# Patient Record
Sex: FEMALE | Race: WHITE | NOT HISPANIC OR LATINO | Employment: FULL TIME | URBAN - METROPOLITAN AREA
[De-identification: names, ages, dates, MRNs, and addresses within clinical notes are randomized per-mention and may not be internally consistent; named-entity substitution may affect disease eponyms.]

---

## 2023-05-19 ENCOUNTER — OFFICE VISIT (OUTPATIENT)
Dept: OBSTETRICS AND GYNECOLOGY | Facility: CLINIC | Age: 28
End: 2023-05-19
Payer: COMMERCIAL

## 2023-05-19 VITALS
SYSTOLIC BLOOD PRESSURE: 121 MMHG | WEIGHT: 176.6 LBS | HEIGHT: 67 IN | DIASTOLIC BLOOD PRESSURE: 70 MMHG | BODY MASS INDEX: 27.72 KG/M2 | HEART RATE: 71 BPM

## 2023-05-19 DIAGNOSIS — Z01.411 ENCOUNTER FOR GYNECOLOGICAL EXAMINATION WITH ABNORMAL FINDING: Primary | ICD-10-CM

## 2023-05-19 DIAGNOSIS — Z12.4 SCREENING FOR MALIGNANT NEOPLASM OF CERVIX: ICD-10-CM

## 2023-05-19 DIAGNOSIS — Z11.3 SCREEN FOR SEXUALLY TRANSMITTED DISEASES: ICD-10-CM

## 2023-05-19 NOTE — PROGRESS NOTES
Chief Complaint  New Gyn (Patient is here for an annual exam. )    Subjective        Lalita Collazo presents to [facility]  History of Present Illness  Patient is here for annual exam.  She is without gynecologic complaints at this time.  She has a Mirena IUD which was placed about 2 years ago.  She reports amenorrhea with the IUD.  Patient says that she is considering serving as an egg donor.  She asks if she would have to have the IUD removed prior to this process.  She is otherwise without complaints at this time.    Menstrual History:  OB History        2    Para   2    Term   2            AB        Living   2       SAB        IAB        Ectopic        Molar        Multiple        Live Births   2               No LMP recorded. Patient has had an implant.     History reviewed. No pertinent past medical history.    History reviewed. No pertinent surgical history.    Social History     Tobacco Use   • Smoking status: Never   Vaping Use   • Vaping Use: Never used   Substance Use Topics   • Alcohol use: No   • Drug use: No       Family History   Problem Relation Age of Onset   • Colon cancer Maternal Grandfather    • Breast cancer Neg Hx    • Ovarian cancer Neg Hx    • Uterine cancer Neg Hx        Current Outpatient Medications on File Prior to Visit   Medication Sig   • FIBER PO Take  by mouth.   • Levonorgestrel (MIRENA) 20 MCG/DAY intrauterine device IUD 1 each by Intrauterine route.   • [DISCONTINUED] ibuprofen (ADVIL,MOTRIN) 800 MG tablet Take 1 tablet by mouth Every 8 (Eight) Hours As Needed (chest pain).     No current facility-administered medications on file prior to visit.       Allergies   Allergen Reactions   • Penicillins          Review of systems:  Constitutional: No fevers, chills, sweats   Eye: No recent visual problems, denies blurry vision   HEENT: No ear pain, nasal congestion, sore throat, voice changes   Respiratory: No shortness of breath,  "cough, pain on breathing   Cardiovascular: No Chest pain, palpitations   Gastrointestinal: No nausea, vomiting, diarrhea, constipation   Genitourinary: No hematuria, dysuria, lesions on genitalia   Hema/Lymph: Negative for bruising, no edema   Endocrine: Negative for excessive thirst, excessive hunger, heat or cold intolerance   Musculoskeletal: No joint pain, muscle pain, decreased range of motion   Integumentary: No rash, pruritus, abrasions, lesions   Neurologic: No weakness, numbness, headaches   Psychiatric: No anxiety, depression, mood changes         Objective   Vital Signs:  /70   Pulse 71   Ht 170.2 cm (67\")   Wt 80.1 kg (176 lb 9.6 oz)   BMI 27.66 kg/m²   Estimated body mass index is 27.66 kg/m² as calculated from the following:    Height as of this encounter: 170.2 cm (67\").    Weight as of this encounter: 80.1 kg (176 lb 9.6 oz).             Physical Exam   Exam performed in the presence of a female chaperone  Patient has provided verbal consent to proceed with exam.    Gen: No acute distress, awake and oriented times three  HENT: Normocephalic, atraumatic, Moist mucous membranes  Eyes: PERRLA, EOMI  Lungs: Normal work of breathing, lungs clear bilaterally  Breast: Symmetrical. No skin changes or nipple retractions. No lumps or masses bilaterally. No tenderness bilaterally.  Abdomen: soft, nontender, no masses or hernia, no hepatosplenomegaly, non distended, normoactive bowel sounds  Normal external female genitalia, no lesions  Urethra: Normal meatus, no caruncle  Bladder: nontender  Vagina: No blood or discharge  Cervix: No cervical motion tenderness, no lesions, no active bleeding, nonfriable, IUD string seen at os and appear normal length  Uterus: Anteverted, normal size and shape, nontender  Adnexa: No masses or tenderness  External anal exam: Normal appearance, no lesions or hemorrhoids  Rectal: Deferred  Skin: Warm and dry, no rashes  Psych: Good judgement and insight, normal affect and " mood  Neuro: CN 2-12 intact, no gross deficits      Result Review :                   Assessment and Plan   Diagnoses and all orders for this visit:    1. Encounter for gynecological examination with abnormal finding (Primary)    Pap - Ordered today.  STD screening - Cultures performed today. Labs offered to pt and patient declines.  Contraception - Options discussed with pt at length. Risks, benefits, side effects, and alternatives to various forms of hormonal, nonhormonal and barrier methods discussed. Pt elects continue Mirena.  Patient asked if she would need to have her Mirena IUD removed prior to egg retrieval.  I explained that I do not know the answer to this.  I recommend that she discuss this with the [facility] practice that she sees for this process.  If she desires or needs to have her IUD removed, we are able to do this for her at her request.  Safe sex practices encouraged with patient.  Breast cancer screening. Patient encouraged to perform routine self breast exams. Recommend yearly clinical breast exam and mammogram starting at age 40.  Recommend pt take calcium and vitamin D supplementation as well as prenatal vitamin or folic acid if she is attempting to conceive.  Encourage aerobic exercise with at least 30 minutes 5 days/wk.  Avoid excessive alcohol use.  Patient is advised to call the office for results after 1 week if she has not seen or heard the results of any tests ordered or performed today.    2. Screening for malignant neoplasm of cervix  -     IGP,CtNgTv,rfx Apt HPV All    3. Screen for sexually transmitted diseases  -     IGP,CtNgTv,rfx Apt HPV All                 Follow Up   Return in about 1 year (around 5/19/2024) for Annual physical.  Patient was given instructions and counseling regarding her condition or for health maintenance advice. Please see specific information pulled into the AVS if appropriate.

## 2023-05-25 LAB
C TRACH RRNA CVX QL NAA+PROBE: NEGATIVE
CONV .: NORMAL
CYTOLOGIST CVX/VAG CYTO: NORMAL
CYTOLOGY CVX/VAG DOC CYTO: NORMAL
CYTOLOGY CVX/VAG DOC THIN PREP: NORMAL
DX ICD CODE: NORMAL
HIV 1 & 2 AB SER-IMP: NORMAL
N GONORRHOEA RRNA CVX QL NAA+PROBE: NEGATIVE
OTHER STN SPEC: NORMAL
STAT OF ADQ CVX/VAG CYTO-IMP: NORMAL
T VAGINALIS RRNA SPEC QL NAA+PROBE: NEGATIVE

## 2023-06-14 ENCOUNTER — OFFICE VISIT (OUTPATIENT)
Dept: OBSTETRICS AND GYNECOLOGY | Facility: CLINIC | Age: 28
End: 2023-06-14
Payer: COMMERCIAL

## 2023-06-14 VITALS
DIASTOLIC BLOOD PRESSURE: 68 MMHG | HEIGHT: 67 IN | SYSTOLIC BLOOD PRESSURE: 120 MMHG | BODY MASS INDEX: 28.47 KG/M2 | WEIGHT: 181.4 LBS

## 2023-06-14 DIAGNOSIS — Z30.011 ENCOUNTER FOR INITIAL PRESCRIPTION OF CONTRACEPTIVE PILLS: ICD-10-CM

## 2023-06-14 DIAGNOSIS — Z30.432 ENCOUNTER FOR IUD REMOVAL: Primary | ICD-10-CM

## 2023-06-14 RX ORDER — NORETHINDRONE ACETATE AND ETHINYL ESTRADIOL AND FERROUS FUMARATE 1MG-20(24)
1 KIT ORAL DAILY
Qty: 28 TABLET | Refills: 12 | Status: SHIPPED | OUTPATIENT
Start: 2023-06-14 | End: 2024-06-13

## 2023-06-14 NOTE — PROGRESS NOTES
Procedure: Mirena Intrauterine device removal  Preoperative diagnosis: Encounter for IUD removal  Postoperative diagnosis: Same  Indications: Patient presents for IUD removal.  She has plans to try to service an egg donor, and the fertility specialist have recommended that she have her IUD removed.  She would like to start on birth control pills at that of this.  Anesthesia: None  Pathology: None  Estimated blood loss: Less than 5 mL  Complications: None    Procedure in detail:  Patient placed in lithotomy position in stirrups. Spring speculum placed into vagina. Cervix was visualized. IUD strings were seen at the external os. Strings were grasped with a ring forceps and the IUD was easily removed with slow, steady, gentle tension. IUD was removed intact and discarded. No complications. Patient tolerated the procedure well. She was instructed to use condoms as a backup method for the first month if not attempting to conceive.

## 2023-06-14 NOTE — PROGRESS NOTES
"Chief Complaint  Gynecologic Exam (Patient is here today to mirena removed. Would like to discuss starting the pill)    Subjective        Lalita Collazo presents to [facility]  History of Present Illness  Patient is here to have her IUD removed and to discuss starting on the birth control pill.  Patient has plans to try to serve as an egg donor, and the infertility specialists are recommending that she have her Mirena removed at this time.  As she is not actively trying to conceive, she would like to start on birth control pill.  The infertility office has also recommended this as well.  She is otherwise without complaints at this time.  Typically has amenorrhea with her IUD.  She has taken birth control pills in the past.  She says in general it worked well for her.  She does not remember which specific type of birth control pill she was taking.\\    The following portions of the patient's history were reviewed and updated as appropriate: allergies, current medications, past family history, past medical history, past social history, past surgical history, and problem list.]    Objective   Vital Signs:  /68   Ht 170.2 cm (67\")   Wt 82.3 kg (181 lb 6.4 oz)   BMI 28.41 kg/m²   Estimated body mass index is 28.41 kg/m² as calculated from the following:    Height as of this encounter: 170.2 cm (67\").    Weight as of this encounter: 82.3 kg (181 lb 6.4 oz).             Physical Exam   Gen: No acute distress, awake and oriented times three  Abdomen: soft, nontender, no masses or hernia, no hepatosplenomegaly, non distended, normoactive bowel sounds  Pelvic: Exam performed in the presence of a female chaperone  Patient has provided verbal consent to proceed with exam.  Normal external female genitalia, no lesions  Urethra: Normal meatus, no caruncle  Bladder: nontender  Vagina: No blood or discharge  Cervix: No cervical motion tenderness, no lesions, no active bleeding, nonfriable, IUD string " seen at the os, normal line  External anal exam: Normal appearance, no lesions or hemorrhoids  Rectal: Deferred  Psych: Good judgement and insight, normal affect and mood\  Result Review :                   Assessment and Plan   Diagnoses and all orders for this visit:    1. Encounter for IUD removal (Primary)  IUD removed without difficulty.  See procedure note for further details.    2. Encounter for initial prescription of contraceptive pills  -     norethindrone-ethinyl estradiol-ferrous fumarate (LOESTIN 24 FE) 1-20 MG-MCG(24) per tablet; Take 1 tablet by mouth Daily.  Dispense: 28 tablet; Refill: 12    Various contraceptive options discussed including natural family planning and abstinence. Paitent elects for oral contraceptive pills. Risks, benefits, alternatives discussed at length. Risks of venous thromboembolic complications discussed. Instructions for use and Sunday start discussed. Discussed condoms in first month for backup method. Also discussed condoms for STD prevention. All questions answered.            Follow Up   Return in about 1 year (around 6/14/2024), or if symptoms worsen or fail to improve, for Annual physical.  Patient was given instructions and counseling regarding her condition or for health maintenance advice. Please see specific information pulled into the AVS if appropriate.

## 2023-11-29 ENCOUNTER — OFFICE VISIT (OUTPATIENT)
Dept: OBSTETRICS AND GYNECOLOGY | Facility: CLINIC | Age: 28
End: 2023-11-29
Payer: COMMERCIAL

## 2023-11-29 VITALS
HEIGHT: 67 IN | DIASTOLIC BLOOD PRESSURE: 78 MMHG | SYSTOLIC BLOOD PRESSURE: 110 MMHG | WEIGHT: 176.2 LBS | BODY MASS INDEX: 27.65 KG/M2

## 2023-11-29 DIAGNOSIS — Z00.00 ENCOUNTER FOR PHYSICAL EXAMINATION: Primary | ICD-10-CM

## 2023-11-29 NOTE — PROGRESS NOTES
"Chief Complaint  Gynecologic Exam (Patient is here to have form filled out for egg donation )    Subjective        Lalita Collazo presents to [facility]  History of Present Illness  Patient is here for an encounter to fill out a medical physical form to be a candidate for an egg donor.  She had her IUD removed about 5 months ago.  She reports regular menstrual cycle since then.  She is otherwise without gynecologic complaints.  Patient reports no previous issues with adverse effects from anesthesia.    The following portions of the patient's history were reviewed and updated as appropriate: allergies, current medications, past family history, past medical history, past social history, past surgical history, and problem list.    Objective   Vital Signs:  /78   Ht 170.2 cm (67\")   Wt 79.9 kg (176 lb 3.2 oz)   BMI 27.60 kg/m²   Estimated body mass index is 27.6 kg/m² as calculated from the following:    Height as of this encounter: 170.2 cm (67\").    Weight as of this encounter: 79.9 kg (176 lb 3.2 oz).             Physical Exam       Gen: No acute distress, awake and oriented times three  HENT: Normocephalic, atraumatic, Moist mucous membranes  Eyes: PERRLA, EOMI  Neck: Soft, no masses, no thyromegaly, normal range of motion  Cardiovascular: Regular rate and rhythm, no murmurs  Lungs: Normal work of breathing, lungs clear bilaterally  Abdomen: soft, nontender, no masses or hernia, non distended, normoactive bowel sounds  Breast and pelvic exam previously done 5/2023 with normal findings.  Skin: Warm and dry, no rashes  Psych: Good judgement and insight, normal affect and mood  Neuro: CN 2-12 intact, no gross deficits    Result Review :  The following data was reviewed by: Eduin Asher MD on 11/29/2023:        No visits with results within 180 Day(s) from this visit.   Latest known visit with results is:   Office Visit on 05/19/2023   Component Date Value Ref Range Status    " Diagnosis 05/19/2023 Comment   Final    Comment: NEGATIVE FOR INTRAEPITHELIAL LESION OR MALIGNANCY.  PREDOMINANCE OF COCCOBACILLI CONSISTENT WITH SHIFT IN VAGINAL CHANNING IS  PRESENT.      Specimen adequacy: 05/19/2023 Comment   Final    Comment: Satisfactory for evaluation.  Endocervical and/or squamous metaplastic  cells (endocervical component) are present.      Clinician Provided ICD-10: 05/19/2023 Comment   Final    Comment: Z12.4  Z11.3      Performed by: 05/19/2023 Comment   Final    Lala Rivera, Cytotechnologist    . 05/19/2023 .   Final    Note: 05/19/2023 Comment   Final    Comment: The Pap smear is a screening test designed to aid in the detection of  premalignant and malignant conditions of the uterine cervix.  It is not a  diagnostic procedure and should not be used as the sole means of detecting  cervical cancer.  Both false-positive and false-negative reports do occur.      Method: 05/19/2023 Comment   Final    Comment: This liquid based ThinPrep(R) pap test was screened with the  use of an image guided system.      Conv .conv 05/19/2023 Comment   Final    Comment: The HPV DNA reflex criteria were not met with this specimen result  therefore, no HPV testing was performed.      Chlamydia, Nuc. Acid Amp 05/19/2023 Negative  Negative Final    Gonococcus by Nucleic Acid Amp 05/19/2023 Negative  Negative Final    Trichomonas vaginosis 05/19/2023 Negative  Negative Final                Assessment and Plan   Diagnoses and all orders for this visit:    1. Encounter for physical examination (Primary)    No contraindications to the patient serving as a egg donor.  Form filled out on behalf of the patient and returned to her.  She may follow-up here as needed or once a year for annual exam.         Follow Up   No follow-ups on file.  Patient was given instructions and counseling regarding her condition or for health maintenance advice. Please see specific information pulled into the AVS if appropriate.

## 2024-01-19 DIAGNOSIS — Z30.011 ENCOUNTER FOR INITIAL PRESCRIPTION OF CONTRACEPTIVE PILLS: ICD-10-CM

## 2024-01-19 RX ORDER — NORETHINDRONE ACETATE AND ETHINYL ESTRADIOL AND FERROUS FUMARATE 1MG-20(24)
1 KIT ORAL DAILY
Qty: 28 TABLET | Refills: 12 | Status: SHIPPED | OUTPATIENT
Start: 2024-01-19 | End: 2025-01-18

## 2024-01-23 ENCOUNTER — TELEPHONE (OUTPATIENT)
Dept: OBSTETRICS AND GYNECOLOGY | Facility: CLINIC | Age: 29
End: 2024-01-23
Payer: COMMERCIAL

## 2024-01-23 NOTE — TELEPHONE ENCOUNTER
Please let her know that I think it is unlikely we will be able to get this done before January 31.  We will have to submit for prior authorization for IUD.  I assume that she wants a Mirena again, but please verify that with her.    Yusra, can you go ahead and try to start the process of getting prior authorization for a Mirena for contraception.    Please advise the patient that she should not have any intercourse between now and the end of the month if we are going to try to get her Mirena in before the end of the month.

## 2024-01-23 NOTE — TELEPHONE ENCOUNTER
----- Message from Lalita Collazo sent at 1/23/2024 10:46 AM EST -----  Regarding: Appointment Request  Contact: [phone]  Appointment Request From: Lalita Collazo    With Provider: Eduin Asher [[facility]]    Preferred Date Range: Any    Preferred Times: Any Time    Reason for visit: Pre-Op    Comments:  I was disqualified as an egg donor due to AMH levels of 0.505 and Antral follicle count of 9 (both measured on day 2 of period). I’m interested in having an IUD inserted again.  My current insurance is good through January 31, and then I won’t have insurance again until early March.   Most recent period was January 17-22.

## 2024-01-23 NOTE — TELEPHONE ENCOUNTER
Spoke with patient, informed her of the system of sending off pa for iud's before getting, I told her I just faxed hers this morning theres a slight possibility we could get it and if so ill schedule her the day I get, but if not well restart the process in march when she gets her new insurance. She understood completely said she has 3 months worth of birth control pills for if does not get in time Claudette

## 2024-04-15 DIAGNOSIS — Z30.011 ENCOUNTER FOR INITIAL PRESCRIPTION OF CONTRACEPTIVE PILLS: ICD-10-CM

## 2024-04-15 RX ORDER — NORETHINDRONE ACETATE AND ETHINYL ESTRADIOL AND FERROUS FUMARATE 1MG-20(24)
1 KIT ORAL DAILY
Qty: 90 TABLET | Refills: 3 | Status: SHIPPED | OUTPATIENT
Start: 2024-04-15

## 2024-06-14 RX ORDER — LEVONORGESTREL AND ETHINYL ESTRADIOL 0.1-0.02MG
1 KIT ORAL DAILY
Qty: 28 TABLET | Refills: 12 | Status: SHIPPED | OUTPATIENT
Start: 2024-06-14

## 2024-07-10 RX ORDER — LEVONORGESTREL/ETHIN.ESTRADIOL 0.1-0.02MG
1 TABLET ORAL DAILY
Qty: 84 TABLET | Refills: 3 | Status: SHIPPED | OUTPATIENT
Start: 2024-07-10 | End: 2025-07-10

## 2024-07-16 ENCOUNTER — OFFICE VISIT (OUTPATIENT)
Dept: OBSTETRICS AND GYNECOLOGY | Facility: CLINIC | Age: 29
End: 2024-07-16
Payer: COMMERCIAL

## 2024-07-16 VITALS
HEIGHT: 67 IN | SYSTOLIC BLOOD PRESSURE: 130 MMHG | WEIGHT: 165.6 LBS | DIASTOLIC BLOOD PRESSURE: 85 MMHG | BODY MASS INDEX: 25.99 KG/M2

## 2024-07-16 DIAGNOSIS — Z01.411 ENCOUNTER FOR GYNECOLOGICAL EXAMINATION WITH ABNORMAL FINDING: Primary | ICD-10-CM

## 2024-07-16 DIAGNOSIS — Z11.3 SCREEN FOR SEXUALLY TRANSMITTED DISEASES: ICD-10-CM

## 2024-07-16 DIAGNOSIS — Z30.41 ENCOUNTER FOR SURVEILLANCE OF CONTRACEPTIVE PILLS: ICD-10-CM

## 2024-07-16 DIAGNOSIS — Z30.09 FAMILY PLANNING ADVICE: ICD-10-CM

## 2024-07-16 RX ORDER — LEVONORGESTREL AND ETHINYL ESTRADIOL 0.1-0.02MG
1 KIT ORAL DAILY
COMMUNITY
Start: 2024-07-12 | End: 2024-07-16 | Stop reason: SDUPTHER

## 2024-07-16 RX ORDER — LEVONORGESTREL/ETHIN.ESTRADIOL 0.1-0.02MG
1 TABLET ORAL DAILY
Qty: 84 TABLET | Refills: 3 | Status: SHIPPED | OUTPATIENT
Start: 2024-07-16 | End: 2025-07-16

## 2024-07-16 NOTE — PROGRESS NOTES
"Chief Complaint  Gynecologic Exam (Annual exam , last pap 2023 neg)    Subjective        Lalita Collazo presents to [facility]  History of Present Illness  Here for annual.  Last year, patient was considering doing a donation; however, when she went to the fertility clinic she was told that she had a \" low ovarian reserve\" based on ultrasound and blood work.  She is uncertain of exactly what types of blood work was done.    The following portions of the patient's history were reviewed and updated as appropriate: allergies, current medications, past family history, past medical history, past social history, past surgical history, and problem list.    Objective   Vital Signs:  /85   Ht 170.2 cm (67.01\")   Wt 75.1 kg (165 lb 9.6 oz)   BMI 25.93 kg/m²   Estimated body mass index is 25.93 kg/m² as calculated from the following:    Height as of this encounter: 170.2 cm (67.01\").    Weight as of this encounter: 75.1 kg (165 lb 9.6 oz).             Physical Exam   Exam performed in the presence of a female chaperone  Patient has provided verbal consent to proceed with exam.    Gen: No acute distress, awake and oriented times three  HENT: Normocephalic, atraumatic, Moist mucous membranes  Eyes: PERRLA, EOMI  Lungs: Normal work of breathing, lungs clear bilaterally  Breast: Symmetrical. No skin changes or nipple retractions. No lumps or masses bilaterally. No tenderness bilaterally.  Abdomen: soft, nontender, no masses or hernia, non distended, normoactive bowel sounds  Normal external female genitalia, no lesions  Urethra: Normal meatus, no caruncle  Bladder: nontender  Vagina: No blood or discharge  Cervix: No cervical motion tenderness, no lesions, no active bleeding, nonfriable  Uterus: Anteverted, normal size and shape, nontender  Adnexa: No masses or tenderness  External anal exam: Normal appearance, no lesions or hemorrhoids  Rectal: Deferred  Skin: Warm and dry, no rashes  Psych: Good " judgement and insight, normal affect and mood  Neuro: CN 2-12 intact, no gross deficits    Result Review :                     Assessment and Plan     Diagnoses and all orders for this visit:    1. Encounter for gynecological examination with abnormal finding (Primary)    2. Family planning advice    3. Screen for sexually transmitted diseases  -     HIV-1 / O / 2 Ag / Antibody  -     Hepatitis B Surface Antigen  -     HCV Antibody Rfx To Qnt PCR  -     RPR, Rfx Qn RPR / Confirm TP  -     Chlamydia trachomatis, Neisseria gonorrhoeae, Trichomonas vaginalis, PCR - Urine, Urine, Clean Catch    4. Encounter for surveillance of contraceptive pills  -     levonorgestrel-ethinyl estradiol (AVIANE,ALESSE,LESSINA) 0.1-20 MG-MCG per tablet; Take 1 tablet by mouth Daily.  Dispense: 84 tablet; Refill: 3    Other orders  -     Interpretation:    Pap -up-to-date.  Not indicated until 2026.  STD screening - Cultures performed today. Labs offered to pt and patient agrees.  Contraception - Options discussed with pt at length. Risks, benefits, side effects, and alternatives to various forms of hormonal, nonhormonal and barrier methods discussed. Pt elects continue OCPs.   Safe sex practices encouraged with patient.  Breast cancer screening. Patient encouraged to perform routine self breast exams. Recommend yearly clinical breast exam and mammogram starting at age 40.  Recommend pt take calcium and vitamin D supplementation as well as prenatal vitamin or folic acid if she is attempting to conceive.  Encourage aerobic exercise with at least 30 minutes 5 days/wk.  Avoid excessive alcohol use.  Patient is advised to call the office for results after 1 week if she has not seen or heard the results of any tests ordered or performed today.      We discussed the patient's recent diagnosis of a low ovarian reserve.  We discussed ways that this is diagnosed.  If the patient can find what blood work was done, this will be helpful to know  further counseling.  Potentially an AMH may be of use if she has not done it yet.  We discussed this low ovarian reserve in terms of the patient's own future fertility and risk of premature menopause.  Explained that if she is interested in having children in the future, she may wish to consider oocyte preservation.  Explained this is often covered under a lot of insurance plans now.  Pt says she will consider. Wants to stay on OCPs for now           Follow Up     Return in about 1 year (around 7/16/2025).  Patient was given instructions and counseling regarding her condition or for health maintenance advice. Please see specific information pulled into the AVS if appropriate.

## 2024-07-17 LAB
HBV SURFACE AG SERPL QL IA: NEGATIVE
HCV AB SERPL QL IA: NORMAL
HCV IGG SERPL QL IA: NON REACTIVE
HIV 1+2 AB+HIV1 P24 AG SERPL QL IA: NON REACTIVE
RPR SER QL: NON REACTIVE

## 2024-07-18 ENCOUNTER — TELEPHONE (OUTPATIENT)
Dept: OBSTETRICS AND GYNECOLOGY | Facility: CLINIC | Age: 29
End: 2024-07-18
Payer: COMMERCIAL

## 2024-07-18 LAB
C TRACH RRNA SPEC QL NAA+PROBE: NORMAL
N GONORRHOEA RRNA SPEC QL NAA+PROBE: NORMAL
T VAGINALIS RRNA SPEC QL NAA+PROBE: NORMAL

## 2024-07-18 NOTE — TELEPHONE ENCOUNTER
----- Message from Eduin Asher sent at 7/18/2024 10:43 AM EDT -----  Please let the patient know that her STD blood work was all negative, but they were unable to run the test for gonorrhea and chlamydia.  If she would like to have this done, she can be added on any day to come in and have another test performed.

## 2024-11-25 RX ORDER — NORETHINDRONE ACETATE AND ETHINYL ESTRADIOL 1MG-20(21)
1 KIT ORAL DAILY
Qty: 90 TABLET | Refills: 3 | Status: SHIPPED | OUTPATIENT
Start: 2024-11-25 | End: 2025-11-25

## 2025-01-22 RX ORDER — LEVONORGESTREL/ETHIN.ESTRADIOL 0.1-0.02MG
1 TABLET ORAL DAILY
Qty: 28 TABLET | Refills: 12 | Status: SHIPPED | OUTPATIENT
Start: 2025-01-22 | End: 2025-01-23 | Stop reason: SDUPTHER

## 2025-01-23 RX ORDER — LEVONORGESTREL AND ETHINYL ESTRADIOL 0.1-0.02MG
1 KIT ORAL DAILY
Qty: 28 TABLET | Refills: 12 | Status: SHIPPED | OUTPATIENT
Start: 2025-01-23

## 2025-07-21 ENCOUNTER — TELEPHONE (OUTPATIENT)
Dept: OBSTETRICS AND GYNECOLOGY | Facility: CLINIC | Age: 30
End: 2025-07-21
Payer: COMMERCIAL

## 2025-07-21 DIAGNOSIS — Z30.41 ENCOUNTER FOR SURVEILLANCE OF CONTRACEPTIVE PILLS: Primary | ICD-10-CM

## 2025-07-21 RX ORDER — LEVONORGESTREL/ETHIN.ESTRADIOL 0.1-0.02MG
1 TABLET ORAL DAILY
Qty: 84 TABLET | Refills: 0 | Status: SHIPPED | OUTPATIENT
Start: 2025-07-21 | End: 2025-10-13

## 2025-07-21 RX ORDER — LEVONORGESTREL/ETHIN.ESTRADIOL 0.1-0.02MG
1 TABLET ORAL DAILY
COMMUNITY
End: 2025-07-21 | Stop reason: SDUPTHER

## 2025-07-21 NOTE — TELEPHONE ENCOUNTER
----- Message from Dakota Smiley sent at 7/21/2025 11:45 AM EDT -----    ----- Message -----  From: Tsering Phoenix MA  Sent: 7/21/2025  10:01 AM EDT  To: Dakota Smiley MD

## 2025-08-06 ENCOUNTER — OFFICE VISIT (OUTPATIENT)
Dept: OBSTETRICS AND GYNECOLOGY | Facility: CLINIC | Age: 30
End: 2025-08-06
Payer: COMMERCIAL

## 2025-08-06 VITALS
DIASTOLIC BLOOD PRESSURE: 80 MMHG | WEIGHT: 155.4 LBS | BODY MASS INDEX: 24.39 KG/M2 | HEIGHT: 67 IN | SYSTOLIC BLOOD PRESSURE: 138 MMHG

## 2025-08-06 DIAGNOSIS — Z30.41 ENCOUNTER FOR SURVEILLANCE OF CONTRACEPTIVE PILLS: ICD-10-CM

## 2025-08-06 DIAGNOSIS — Z01.419 ENCOUNTER FOR GYNECOLOGICAL EXAMINATION (GENERAL) (ROUTINE) WITHOUT ABNORMAL FINDINGS: Primary | ICD-10-CM

## 2025-08-06 DIAGNOSIS — Z11.3 SCREEN FOR SEXUALLY TRANSMITTED DISEASES: ICD-10-CM

## 2025-08-06 RX ORDER — LEVONORGESTREL/ETHIN.ESTRADIOL 0.1-0.02MG
1 TABLET ORAL DAILY
Qty: 84 TABLET | Refills: 3 | Status: SHIPPED | OUTPATIENT
Start: 2025-08-06 | End: 2026-07-08

## 2025-08-07 LAB
C TRACH RRNA SPEC QL NAA+PROBE: NEGATIVE
HBV SURFACE AG SERPL QL IA: NEGATIVE
HCV AB SERPL QL IA: NON REACTIVE
HCV AB SERPL QL IA: NORMAL
HIV 1+2 AB+HIV1 P24 AG SERPL QL IA: NON REACTIVE
N GONORRHOEA RRNA SPEC QL NAA+PROBE: NEGATIVE
RPR SER QL: NON REACTIVE
T VAGINALIS RRNA SPEC QL NAA+PROBE: NEGATIVE